# Patient Record
Sex: FEMALE | Race: ASIAN | URBAN - METROPOLITAN AREA
[De-identification: names, ages, dates, MRNs, and addresses within clinical notes are randomized per-mention and may not be internally consistent; named-entity substitution may affect disease eponyms.]

---

## 2018-01-05 ENCOUNTER — HOSPITAL ENCOUNTER (INPATIENT)
Facility: CLINIC | Age: 48
LOS: 4 days | Discharge: HOME | DRG: 885 | End: 2018-01-09
Attending: PSYCHIATRY & NEUROLOGY | Admitting: PSYCHIATRY & NEUROLOGY
Payer: COMMERCIAL

## 2018-01-05 DIAGNOSIS — E55.9 VITAMIN D DEFICIENCY: ICD-10-CM

## 2018-01-05 DIAGNOSIS — F32.2 SEVERE SINGLE CURRENT EPISODE OF MAJOR DEPRESSIVE DISORDER, WITHOUT PSYCHOTIC FEATURES (H): ICD-10-CM

## 2018-01-05 DIAGNOSIS — F32.A DEPRESSIVE DISORDER: Primary | ICD-10-CM

## 2018-01-05 LAB
AMPHETAMINES UR QL SCN: NEGATIVE
BARBITURATES UR QL: NEGATIVE
BENZODIAZ UR QL: NEGATIVE
CANNABINOIDS UR QL SCN: NEGATIVE
COCAINE UR QL: NEGATIVE
ETHANOL UR QL SCN: NEGATIVE
OPIATES UR QL SCN: NEGATIVE

## 2018-01-05 PROCEDURE — 99285 EMERGENCY DEPT VISIT HI MDM: CPT | Mod: 25 | Performed by: PSYCHIATRY & NEUROLOGY

## 2018-01-05 PROCEDURE — 90791 PSYCH DIAGNOSTIC EVALUATION: CPT

## 2018-01-05 PROCEDURE — 80320 DRUG SCREEN QUANTALCOHOLS: CPT | Performed by: PSYCHIATRY & NEUROLOGY

## 2018-01-05 PROCEDURE — 99222 1ST HOSP IP/OBS MODERATE 55: CPT | Mod: AI | Performed by: CLINICAL NURSE SPECIALIST

## 2018-01-05 PROCEDURE — 12400007 ZZH R&B MH INTERMEDIATE UMMC

## 2018-01-05 PROCEDURE — 99285 EMERGENCY DEPT VISIT HI MDM: CPT | Mod: Z6 | Performed by: PSYCHIATRY & NEUROLOGY

## 2018-01-05 PROCEDURE — 80307 DRUG TEST PRSMV CHEM ANLYZR: CPT | Performed by: PSYCHIATRY & NEUROLOGY

## 2018-01-05 RX ORDER — DILTIAZEM HYDROCHLORIDE 120 MG/1
240 CAPSULE, EXTENDED RELEASE ORAL DAILY
Status: DISCONTINUED | OUTPATIENT
Start: 2018-01-06 | End: 2018-01-09 | Stop reason: HOSPADM

## 2018-01-05 RX ORDER — DILTIAZEM HYDROCHLORIDE 240 MG/1
240 CAPSULE, EXTENDED RELEASE ORAL DAILY
COMMUNITY

## 2018-01-05 RX ORDER — BISACODYL 10 MG
10 SUPPOSITORY, RECTAL RECTAL DAILY PRN
Status: DISCONTINUED | OUTPATIENT
Start: 2018-01-05 | End: 2018-01-09 | Stop reason: HOSPADM

## 2018-01-05 RX ORDER — LOSARTAN POTASSIUM 50 MG/1
50 TABLET ORAL DAILY
Status: DISCONTINUED | OUTPATIENT
Start: 2018-01-06 | End: 2018-01-09 | Stop reason: HOSPADM

## 2018-01-05 RX ORDER — LOSARTAN POTASSIUM 50 MG/1
50 TABLET ORAL DAILY
COMMUNITY

## 2018-01-05 RX ORDER — LEVOTHYROXINE SODIUM 100 UG/1
100 TABLET ORAL DAILY
COMMUNITY

## 2018-01-05 RX ORDER — ACETAMINOPHEN 325 MG/1
650 TABLET ORAL EVERY 4 HOURS PRN
Status: DISCONTINUED | OUTPATIENT
Start: 2018-01-05 | End: 2018-01-09 | Stop reason: HOSPADM

## 2018-01-05 RX ORDER — LORATADINE 10 MG/1
10 TABLET ORAL DAILY PRN
COMMUNITY

## 2018-01-05 RX ORDER — HYDROXYZINE HCL 25 MG
12.5-25 TABLET ORAL EVERY 4 HOURS PRN
Status: DISCONTINUED | OUTPATIENT
Start: 2018-01-05 | End: 2018-01-09 | Stop reason: HOSPADM

## 2018-01-05 RX ORDER — BUPROPION HCL 100 MG
25 TABLET ORAL 2 TIMES DAILY
Status: ON HOLD | COMMUNITY
End: 2018-01-09

## 2018-01-05 RX ORDER — ALUMINA, MAGNESIA, AND SIMETHICONE 2400; 2400; 240 MG/30ML; MG/30ML; MG/30ML
30 SUSPENSION ORAL EVERY 4 HOURS PRN
Status: DISCONTINUED | OUTPATIENT
Start: 2018-01-05 | End: 2018-01-09 | Stop reason: HOSPADM

## 2018-01-05 RX ORDER — LEVOTHYROXINE SODIUM 50 UG/1
100 TABLET ORAL DAILY
Status: DISCONTINUED | OUTPATIENT
Start: 2018-01-06 | End: 2018-01-09 | Stop reason: HOSPADM

## 2018-01-05 ASSESSMENT — ENCOUNTER SYMPTOMS
CHEST TIGHTNESS: 0
SHORTNESS OF BREATH: 0
HALLUCINATIONS: 0
DYSPHORIC MOOD: 1
ABDOMINAL PAIN: 0
DIZZINESS: 0
FEVER: 0
BACK PAIN: 0
NERVOUS/ANXIOUS: 1

## 2018-01-05 ASSESSMENT — ACTIVITIES OF DAILY LIVING (ADL)
ORAL_HYGIENE: INDEPENDENT
DRESS: INDEPENDENT
LAUNDRY: WITH SUPERVISION
GROOMING: INDEPENDENT

## 2018-01-05 NOTE — PHARMACY-ADMISSION MEDICATION HISTORY
Admission medication history interview status for the 1/5/2018 admission is complete. See Epic admission navigator for allergy information, pharmacy, prior to admission medications and immunization status.     Medication history interview sources:  Patient, patient's family, [facility] ([place]; [phone])    Changes made to PTA medication list (reason)  Added: loratadine, Wellbutrin  Deleted: none  Changed: Diltiazem (clarified with outpatient pharmacy)    Additional medication history information (including reliability of information, actions taken by pharmacist): The patient and her family were reliable historians and able to discuss medications without difficulty. This writer spoke with the outpatient [facility] pharmacist regarding her recent prescription filling history. The patient has a prescription for Wellbutrin that she has not filled yet (so unable to verify with a pharmacy). She thinks it was 25 mg but was not confident about the dose or the formulation. She was confident it was prescribed for twice daily dosing. If needed, her family offered to have the prescription brought into the hospital.      Prior to Admission medications    Medication Sig Last Dose Taking? Auth Provider   loratadine (CLARITIN) 10 MG tablet Take 10 mg by mouth daily as needed for allergies  Yes Unknown, Entered By History   diltiazem 240 MG 24 hr capsule Take 240 mg by mouth daily 1/5/2018 at AM Yes Unknown, Entered By History   levothyroxine (SYNTHROID/LEVOTHROID) 100 MCG tablet Take 100 mcg by mouth daily 1/5/2018 at AM Yes Unknown, Entered By History   losartan (COZAAR) 50 MG tablet Take 50 mg by mouth daily 1/5/2018 at AM Yes Unknown, Entered By History   buPROPion (WELLBUTRIN) 25 mg quarter-tab Take 25 mg by mouth 2 times daily Not started yet Yes Unknown, Entered By History         Medication history completed by: Celeste Gao, PharmD, BCPP

## 2018-01-05 NOTE — ED PROVIDER NOTES
"  History     Chief Complaint   Patient presents with     Depression     Brought here by our ER nurse Jaquelin. Here with . 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 Diagnosed 2 years ago, \"September 2016 with Stage 3 kidney disease and that is what started the depression\".  Family stressors. Two weeks ago mother had fallen and thought she had a stroke. \"I wish I would not wake up \", but denies SI. Not sleeping for 2 nights.     The history is provided by the patient, medical records, the spouse and a relative.     Pratima uCllen is a 47 year old female who comes in due to her family being very concerned about her worsening depression.  She started getting depressed a few months ago, but in the last 2 weeks she has had a drastic turn for the worse.  She has not been able to go to work (she is a part time RN) and is struggling to get out of bed or do other ADL's.  She has three daughters ages 17, 14, 11 and she is obsessing over them not being emotionally hurt.  She is tracking where they are via their phones and constantly checking on them.  She has some passive suicidal thoughts but no plan.  She has never attempted before.  This is her first episode of depression. She is stressed about being diagnosed with stage 3 kidney disease with subsequent HTN in the summer of 2016.  She was supposed to start wellbutrin for her depression a few weeks ago after seeing a psychiatrist for the first time.  She never started it due to being fearful it would be bad with her kidney issues.  She was supposed to go to her renal MD but missed the appointment due to not being able to get out of bed. Her family is very worried about her and how much her functioning has changed.      Please see the 's assessment in [place] from today for further details.    I have reviewed the Medications, Allergies, Past Medical and Surgical History, and Social History in the Epic system.    Review of Systems "   Constitutional: Negative for fever.   Eyes: Negative for visual disturbance.   Respiratory: Negative for chest tightness and shortness of breath.    Cardiovascular: Negative for chest pain.   Gastrointestinal: Negative for abdominal pain.   Musculoskeletal: Negative for back pain.   Neurological: Negative for dizziness.   Psychiatric/Behavioral: Positive for dysphoric mood and suicidal ideas. Negative for hallucinations and self-injury. The patient is nervous/anxious.    All other systems reviewed and are negative.      Physical Exam   BP: (!) 160/112  Heart Rate: 100  Temp: 97.6  F (36.4  C)  Resp: 16  Weight: 60.4 kg (133 lb 2 oz)  SpO2: 100 %      Physical Exam   Constitutional: She is oriented to person, place, and time. She appears well-developed and well-nourished.   HENT:   Head: Normocephalic and atraumatic.   Mouth/Throat: Oropharynx is clear and moist.   Eyes: Pupils are equal, round, and reactive to light.   Neck: Normal range of motion. Neck supple.   Cardiovascular: Normal rate, regular rhythm and normal heart sounds.    Pulmonary/Chest: Effort normal and breath sounds normal.   Abdominal: Soft. Bowel sounds are normal.   Musculoskeletal: Normal range of motion.   Neurological: She is alert and oriented to person, place, and time.   Skin: Skin is warm and dry.   Psychiatric: Her speech is normal and behavior is normal. Judgment normal. Her mood appears anxious. She is not actively hallucinating. Thought content is not paranoid and not delusional. Cognition and memory are normal. She exhibits a depressed mood. She expresses suicidal ideation. She expresses no homicidal ideation. She expresses no suicidal plans and no homicidal plans.   Pratima is a 46 y/o female who looks her age.  She is well groomed with good eye contact.  She is tearful.   Nursing note and vitals reviewed.      ED Course     ED Course     Procedures               Labs Ordered and Resulted from Time of ED Arrival Up to the Time of  Departure from the ED - No data to display         Assessments & Plan (with Medical Decision Making)   Pratima will be admitted to the hospital due to her drastic decline in functioning not allowing her to do a less intensive program along with having passive suicidal thoughts.  She will go to station 10 under Dr. Ledezma.    I have reviewed the nursing notes.    I have reviewed the findings, diagnosis, plan and need for follow up with the patient.    New Prescriptions    No medications on file       Final diagnoses:   None       1/5/2018   [facility], [place], EMERGENCY DEPARTMENT     Silvio Metcalf MD  01/05/18 1370

## 2018-01-05 NOTE — IP AVS SNAPSHOT
MRN:[id number]                      After Visit Summary   1/5/2018    Pratima Cullen    MRN: [id number]           Thank you!     Thank you for choosing [facility] for your care. Our goal is always to provide you with excellent care.        Patient Information     Date Of Birth          1970        Designated Caregiver       Most Recent Value    Caregiver    Will someone help with your care after discharge? yes    Name of designated caregiver -Perez Cullen    Phone number of caregiver [phone]    Caregiver address [address] [place] [address]      About your hospital stay     You were admitted on:  January 5, 2018 You last received care in the:   [facility]    You were discharged on:  January 9, 2018        Reason for your hospital stay       Depression                  Who to Call     For medical emergencies, please call 911.  For non-urgent questions about your medical care, please call your primary care provider or clinic, None          Attending Provider     Provider Specialty    Silvio Metcalf MD Emergency Medicine    [facility] Pasquale Scott MD Psychiatry       Primary Care Provider Fax #    [facility] [phone]      After Care Instructions     Activity       Your activity upon discharge: activity as tolerated            Diet       Follow this diet upon discharge: Orders Placed This Encounter      Regular Diet Adult            Discharge Instructions       1. Please do not harm yourself or others.  2. Please continue to take your medications.  3. Please follow up with your outpatient care team.  4. Please do not take drugs or alcohol as this will worsen your condition.  5. Please do not take more than the prescribed doses of medications as this may make them dangerous.   6. Please follow your safety plan of action.  7. Please call crisis if having trouble.  8. If having thoughts of harming self or others please come in to the emergency department as soon as  possible.                  Further instructions from your care team        Behavioral Discharge Planning and Instructions      Summary:  You were admitted on 1/5/2018  due to Depression and Suicidal Ideations.  You were treated by Dr. Pasquale Ledezma MD and discharged on 1/9/18 from Station 10 to Home    Principal Diagnosis: Major Depressive Disorder, moderate     Health Care Follow-up Appointments:     Psychiatrist is Dr. Funes @ [facility] in [place]   Phone: [phone]  Fax: [phone] HUC TO FAX DISCHARGE INSTRUCTIONS   [address], [address]  [place] [address]  *You have an intake for psychotherapy with Juan Castro Ph.D [facility] scheduled for Wednesday, January 17th at 9:30 am.   *You have a psychiatric medication management appointment with Dr. Funes scheduled for Wednesday, January 17th at 11:00 am.     Attend all scheduled appointments with your outpatient providers. Call at least 24 hours in advance if you need to reschedule an appointment to ensure continued access to your outpatient providers.   Major Treatments, Procedures and Findings:  You were provided with: a psychiatric assessment, assessed for medical stability, medication evaluation and/or management, group therapy and milieu management    Symptoms to Report: feeling more aggressive, increased confusion, losing more sleep, mood getting worse or thoughts of suicide    Early warning signs can include: increased depression or anxiety sleep disturbances increased thoughts or behaviors of suicide or self-harm  increased unusual thinking, such as paranoia or hearing voices    Safety and Wellness:  Take all medicines as directed.  Make no changes unless your doctor suggests them.      Follow treatment recommendations.  Refrain from alcohol and non-prescribed drugs.  Ask your support system to help you reduce your access to items that could harm yourself or others. If there is a  "concern for safety, call 911.    Resources:   Crisis Intervention: 978.618.3007 or 801-035-2423 (TTY: 367.321.5995).  Call anytime for help.  National [place] on Mental Illness (www.[place].charlene.org): 689.953.9602 or 493-144-2442.  Alcoholics Anonymous (www.alcoholics-anonymous.org): Check your phone book for your local chapter.  Suicide Awareness Voices of Education (SAVE) (www.save.org): 299-257-YENO (7126)  National Suicide Prevention Line (www.mentalhealthmn.org): 639-427-GCFL (4050)  Mental Health Consumer/Survivor Network of [place] (www.mhcsn.net): 172.208.5714 or 200-390-1754  Mental Health Association of [place] (www.mentalhealth.org): 749.425.2099 or 239-826-4528  Self- Management and Recovery Training., [employer]ee-- Toll free: 977.281.5195  [place].YesPlz!  Text 4 Life: txt \"LIFE\" to 39807 for immediate support and crisis intervention  Crisis text line: Text \"START\" to 065-697. Free, confidential, 24/7.  Crisis Intervention: 188.166.9189 or 535-098-7945. Call anytime for help.     The treatment team has appreciated the opportunity to work with you.     Please take care and make your recovery a daily recovery.   If you have any questions or concerns our unit number is [phone].  Thank you.         Pending Results     No orders found from 1/3/2018 to 1/6/2018.            Statement of Approval     Ordered          01/09/18 0951  I have reviewed and agree with all the recommendations and orders detailed in this document.  EFFECTIVE NOW     Approved and electronically signed by:  Pasquale Ledezma MD             Admission Information     Date & Time Provider Department Dept. Phone    1/5/2018 Pasquale Ledezma MD [facility] [phone]      Your Vitals Were     Blood Pressure Pulse Temperature Respirations Height Weight    181/94 100 98.6  F (37  C) 16 1.524 m (5') 58.3 kg (128 lb 8 oz)    Last Period Pulse Oximetry BMI (Body Mass Index)             01/05/2018 100% 25.1 kg/m2         MyChart " "Information     [employer] lets you send messages to your doctor, view your test results, renew your prescriptions, schedule appointments and more. To sign up, go to [email or web address] . Click on \"Log in\" on the left side of the screen, which will take you to the Welcome page. Then click on \"Sign up Now\" on the right side of the page.     You will be asked to enter the access code listed below, as well as some personal information. Please follow the directions to create your username and password.     Your access code is: [email or web address]  Expires: 2018 10:05 AM     Your access code will  in 90 days. If you need help or a new code, please call your [place] clinic or [phone].        Care EveryWhere ID     This is your Care EveryWhere ID. This could be used by other organizations to access your [place] medical records  [id number]        Equal Access to Services     SUELLEN JARRELL : Janet montenegro Solucrecia, waaxda lususanne, qaybta kaalmada adelacy, silverio noriega . So [facility] [phone].    ATENCIÓN: Si prudencio ferrer, tiene a skinner disposición servicios gratuitos de asistencia lingüística. Llame al [phone].    We comply with applicable federal civil rights laws and [place] laws. We do not discriminate on the basis of race, color, national origin, age, disability, sex, sexual orientation, or gender identity.               Review of your medicines      START taking        Dose / Directions    cholecalciferol 2000 UNITS tablet   Used for:  Vitamin D deficiency        Dose:  2000 Units   Start taking on:  1/10/2018   Take 2,000 Units by mouth daily   Quantity:  30 tablet   Refills:  0       escitalopram 5 MG tablet   Commonly known as:  LEXAPRO        Dose:  5 mg   Start taking on:  1/10/2018   Take 1 tablet (5 mg) by mouth daily   Quantity:  30 tablet   Refills:  0         CONTINUE these medicines which have NOT CHANGED        Dose / Directions    diltiazem 240 MG 24 hr " capsule        Dose:  240 mg   Take 240 mg by mouth daily   Refills:  0       levothyroxine 100 MCG tablet   Commonly known as:  SYNTHROID/LEVOTHROID        Dose:  100 mcg   Take 100 mcg by mouth daily   Refills:  0       loratadine 10 MG tablet   Commonly known as:  CLARITIN        Dose:  10 mg   Take 10 mg by mouth daily as needed for allergies   Refills:  0       losartan 50 MG tablet   Commonly known as:  COZAAR        Dose:  50 mg   Take 50 mg by mouth daily   Refills:  0         STOP taking     buPROPion 25 mg quarter-tab   Commonly known as:  WELLBUTRIN                Where to get your medicines      These medications were sent to [facility] [place], [place] - [address]  [address] [place] [address], [facility] [address]     Phone:  [phone]     escitalopram 5 MG tablet         Some of these will need a paper prescription and others can be bought over the counter. Ask your nurse if you have questions.     You don't need a prescription for these medications     cholecalciferol 2000 UNITS tablet                Protect others around you: Learn how to safely use, store and throw away your medicines at www.disposemymeds.org.             Medication List: This is a list of all your medications and when to take them. Check marks below indicate your daily home schedule. Keep this list as a reference.      Medications           Morning Afternoon Evening Bedtime As Needed    cholecalciferol 2000 UNITS tablet   Take 2,000 Units by mouth daily   Start taking on:  1/10/2018   Last time this was given:  2,000 Units on 1/9/2018  9:18 AM                                   diltiazem 240 MG 24 hr capsule   Take 240 mg by mouth daily   Last time this was given:  240 mg on 1/9/2018 10:54 AM                                   escitalopram 5 MG tablet   Commonly known as:  LEXAPRO   Take 1 tablet (5 mg) by mouth daily   Start taking on:  1/10/2018   Last time this was given:  5 mg on 1/9/2018  9:18 AM                                    levothyroxine 100 MCG tablet   Commonly known as:  SYNTHROID/LEVOTHROID   Take 100 mcg by mouth daily   Last time this was given:  100 mcg on 1/9/2018  9:18 AM                                   loratadine 10 MG tablet   Commonly known as:  CLARITIN   Take 10 mg by mouth daily as needed for allergies                                   losartan 50 MG tablet   Commonly known as:  COZAAR   Take 50 mg by mouth daily   Last time this was given:  50 mg on 1/9/2018  9:18 AM

## 2018-01-05 NOTE — ED NOTES
"ED to Behavioral Floor Handoff    SITUATION  Praitma Cullen is a 47 year old female who speaks English and lives in a home with family members The patient arrived in the ED by private car from home with a complaint of Depression (Brought here by our ER nurse Jaquelin. Here with . 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 Diagnosed 2 years ago, \"September 2016 with Stage 3 kidney disease and that is what started the depression\".  Family stressors. Two weeks ago mother had fallen and thought she had a stroke. \"I wish I would not wake up \", but denies SI. Not sleeping for 2 nights.)  .The patient's current symptoms started/worsened 2 week(s) ago and during this time the symptoms have increased.   In the ED, pt was diagnosed with   Final diagnoses:   Severe single current episode of major depressive disorder, without psychotic features (H)        Initial vitals were: BP: (!) 160/112  Heart Rate: 100  Temp: 97.6  F (36.4  C)  Resp: 16  Weight: 60.4 kg (133 lb 2 oz)  SpO2: 100 %   --------  Is the patient diabetic? No   If yes, last blood glucose? --     If yes, was this treated in the ED? --  --------  Is the patient inebriated (ETOH) No or Impaired on other substances? No  MSSA done? N/A  Last MSSA score: --    Were withdrawal symptoms treated? N/A  Does the patient have a seizure history? No. If yes, date of most recent seizure--  --------  Is the patient patient experiencing suicidal ideation? reports occasional suicidal thoughts representing feeling that life is not worth feeling    Homicidal ideation? denies current or recent homicidal ideation or behaviors.    Self-injurious behavior/urges? denies current or recent self injurious behavior or ideation.  ------  Was pt aggressive in the ED No  Was a code called No  Is the pt now cooperative? Yes  -------  Meds given in ED: Medications - No data to display   Family present during ED course? Yes  Family currently present? " Yes    BACKGROUND  Does the patient have a cognitive impairment or developmental disability? No  Allergies:   Allergies   Allergen Reactions     Cats      Ibuprofen Hives     Metamucil [Psyllium] Itching and Cough     Itching nose     Mold      Ragweeds    .   Social demographics are   Social History     Social History     Marital status:      Spouse name: N/A     Number of children: N/A     Years of education: N/A     Social History Main Topics     Smoking status: Never Smoker     Smokeless tobacco: None     Alcohol use No     Drug use: None     Sexual activity: Not Asked     Other Topics Concern     Parent/Sibling W/ Cabg, Mi Or Angioplasty Before 65f 55m? No     Social History Narrative        ASSESSMENT  Labs results   Labs Ordered and Resulted from Time of ED Arrival Up to the Time of Departure from the ED   DRUG ABUSE SCREEN 6 CHEM DEP URINE ([facility])      Imaging Studies: No results found for this or any previous visit (from the past 24 hour(s)).   Most recent vital signs BP (!) 155/97  Temp 97.6  F (36.4  C) (Oral)  Resp 16  Wt 60.4 kg (133 lb 2 oz)  LMP 01/05/2018  SpO2 100%   Abnormal labs/tests/findings requiring intervention:---   Pain control: pt had none  Nausea control: pt had none    RECOMMENDATION  Are any infection precautions needed (MRSA, VRE, etc.)? No If yes, what infection? --  ---  Does the patient have mobility issues? independently. If yes, what device does the pt use? ---  ---  Is patient on 72 hour hold or commitment? No If on 72 hour hold, have hold and rights been given to patient? N/A patient is voluntary   Are admitting orders written if after 10 p.m. ?N/A  Tasks needing to be completed:---     Celeste Cuevas   [facility]-- [phone]   [phone] [facility] ED

## 2018-01-05 NOTE — IP AVS SNAPSHOT
[address]    [address] [place], [place] [address]    [facility] [address]    Phone:  [phone]                                       After Visit Summary   1/5/2018    Pratima Cullen    MRN: [id number]           After Visit Summary Signature Page     I have received my discharge instructions, and my questions have been answered. I have discussed any challenges I see with this plan with the nurse or doctor.    ..........................................................................................................................................  Patient/Patient Representative Signature      ..........................................................................................................................................  Patient Representative Print Name and Relationship to Patient    ..................................................               ................................................  Date                                            Time    ..........................................................................................................................................  Reviewed by Signature/Title    ...................................................              ..............................................  Date                                                            Time

## 2018-01-05 NOTE — PROGRESS NOTES
"   01/05/18 1742   Patient Belongings   Did you bring any home meds/supplements to the hospital?  No   Patient Belongings shoes;watch;cell phone/electronics;clothing;other (see comments)   Disposition of Belongings Locker;Kept with patient   Belongings Search Yes   Clothing Search Yes   Second Staff Aida SANCHEZ       ITEMS IN PT LOCKER    Boots  Knit hat  \"[employer]\" bag w/handles  Small zipper bag w/assorted cosmetics and toiletries  \"iPhone\" in black case  USB cord with wall plug-in    ITEMS WITH PT  Jeans x2  Zip-up x2  T-shrit x3  Long-sleeved shirt x3  Socks  Gray pants x2  Underwear    A               Admission:  I am responsible for any personal items that are not sent to the safe or pharmacy.  [facility] is not responsible for loss, theft or damage of any property in my possession.    Signature:  _________________________________ Date: _______  Time: _____                                              Staff Signature:  ____________________________ Date: ________  Time: _____      2nd Staff person, if patient is unable/unwilling to sign:    Signature: ________________________________ Date: ________  Time: _____     Discharge:  [facility] has returned all of my personal belongings:    Signature: _________________________________ Date: ________  Time: _____                                          Staff Signature:  ____________________________ Date: ________  Time: _____           "

## 2018-01-06 LAB
ALBUMIN SERPL-MCNC: 3.4 G/DL (ref 3.4–5)
ALP SERPL-CCNC: 65 U/L (ref 40–150)
ALT SERPL W P-5'-P-CCNC: 16 U/L (ref 0–50)
ANION GAP SERPL CALCULATED.3IONS-SCNC: 8 MMOL/L (ref 3–14)
AST SERPL W P-5'-P-CCNC: 13 U/L (ref 0–45)
BASOPHILS # BLD AUTO: 0 10E9/L (ref 0–0.2)
BASOPHILS NFR BLD AUTO: 0.3 %
BILIRUB SERPL-MCNC: 0.4 MG/DL (ref 0.2–1.3)
BUN SERPL-MCNC: 41 MG/DL (ref 7–30)
CALCIUM SERPL-MCNC: 8.5 MG/DL (ref 8.5–10.1)
CHLORIDE SERPL-SCNC: 113 MMOL/L (ref 94–109)
CHOLEST SERPL-MCNC: 212 MG/DL
CO2 SERPL-SCNC: 22 MMOL/L (ref 20–32)
CREAT SERPL-MCNC: 2.35 MG/DL (ref 0.52–1.04)
DIFFERENTIAL METHOD BLD: NORMAL
EOSINOPHIL # BLD AUTO: 0.2 10E9/L (ref 0–0.7)
EOSINOPHIL NFR BLD AUTO: 2.8 %
ERYTHROCYTE [DISTWIDTH] IN BLOOD BY AUTOMATED COUNT: 12.7 % (ref 10–15)
GFR SERPL CREATININE-BSD FRML MDRD: 22 ML/MIN/1.7M2
GLUCOSE SERPL-MCNC: 91 MG/DL (ref 70–99)
HCT VFR BLD AUTO: 39.7 % (ref 35–47)
HDLC SERPL-MCNC: 45 MG/DL
HGB BLD-MCNC: 12.9 G/DL (ref 11.7–15.7)
IMM GRANULOCYTES # BLD: 0 10E9/L (ref 0–0.4)
IMM GRANULOCYTES NFR BLD: 0.3 %
LDLC SERPL CALC-MCNC: 118 MG/DL
LYMPHOCYTES # BLD AUTO: 1.6 10E9/L (ref 0.8–5.3)
LYMPHOCYTES NFR BLD AUTO: 20.4 %
MCH RBC QN AUTO: 29.4 PG (ref 26.5–33)
MCHC RBC AUTO-ENTMCNC: 32.5 G/DL (ref 31.5–36.5)
MCV RBC AUTO: 90 FL (ref 78–100)
MONOCYTES # BLD AUTO: 0.4 10E9/L (ref 0–1.3)
MONOCYTES NFR BLD AUTO: 5.4 %
NEUTROPHILS # BLD AUTO: 5.5 10E9/L (ref 1.6–8.3)
NEUTROPHILS NFR BLD AUTO: 70.8 %
NONHDLC SERPL-MCNC: 167 MG/DL
NRBC # BLD AUTO: 0 10*3/UL
NRBC BLD AUTO-RTO: 0 /100
PLATELET # BLD AUTO: 248 10E9/L (ref 150–450)
POTASSIUM SERPL-SCNC: 5.4 MMOL/L (ref 3.4–5.3)
PROT SERPL-MCNC: 7.1 G/DL (ref 6.8–8.8)
RBC # BLD AUTO: 4.39 10E12/L (ref 3.8–5.2)
SODIUM SERPL-SCNC: 143 MMOL/L (ref 133–144)
T4 FREE SERPL-MCNC: 1.23 NG/DL (ref 0.76–1.46)
TRIGL SERPL-MCNC: 245 MG/DL
TSH SERPL DL<=0.005 MIU/L-ACNC: 0.35 MU/L (ref 0.4–4)
WBC # BLD AUTO: 7.8 10E9/L (ref 4–11)

## 2018-01-06 PROCEDURE — 84439 ASSAY OF FREE THYROXINE: CPT | Performed by: PSYCHIATRY & NEUROLOGY

## 2018-01-06 PROCEDURE — 36415 COLL VENOUS BLD VENIPUNCTURE: CPT | Performed by: PSYCHIATRY & NEUROLOGY

## 2018-01-06 PROCEDURE — 80061 LIPID PANEL: CPT | Performed by: PSYCHIATRY & NEUROLOGY

## 2018-01-06 PROCEDURE — 80053 COMPREHEN METABOLIC PANEL: CPT | Performed by: PSYCHIATRY & NEUROLOGY

## 2018-01-06 PROCEDURE — 84443 ASSAY THYROID STIM HORMONE: CPT | Performed by: PSYCHIATRY & NEUROLOGY

## 2018-01-06 PROCEDURE — 25000132 ZZH RX MED GY IP 250 OP 250 PS 637: Performed by: PSYCHIATRY & NEUROLOGY

## 2018-01-06 PROCEDURE — 25000132 ZZH RX MED GY IP 250 OP 250 PS 637: Performed by: CLINICAL NURSE SPECIALIST

## 2018-01-06 PROCEDURE — 82306 VITAMIN D 25 HYDROXY: CPT | Performed by: PSYCHIATRY & NEUROLOGY

## 2018-01-06 PROCEDURE — 85025 COMPLETE CBC W/AUTO DIFF WBC: CPT | Performed by: PSYCHIATRY & NEUROLOGY

## 2018-01-06 PROCEDURE — 12400007 ZZH R&B MH INTERMEDIATE UMMC

## 2018-01-06 RX ORDER — ESCITALOPRAM OXALATE 5 MG/1
5 TABLET ORAL DAILY
Status: DISCONTINUED | OUTPATIENT
Start: 2018-01-06 | End: 2018-01-08

## 2018-01-06 RX ADMIN — ESCITALOPRAM OXALATE 5 MG: 5 TABLET, FILM COATED ORAL at 11:06

## 2018-01-06 RX ADMIN — LOSARTAN POTASSIUM 50 MG: 50 TABLET, FILM COATED ORAL at 09:24

## 2018-01-06 RX ADMIN — VITAMIN D, TAB 1000IU (100/BT) 2000 UNITS: 25 TAB at 11:05

## 2018-01-06 RX ADMIN — LEVOTHYROXINE SODIUM 100 MCG: 50 TABLET ORAL at 09:25

## 2018-01-06 RX ADMIN — DILTIAZEM HYDROCHLORIDE 240 MG: 120 CAPSULE, COATED, EXTENDED RELEASE ORAL at 11:06

## 2018-01-06 ASSESSMENT — ACTIVITIES OF DAILY LIVING (ADL)
DRESS: STREET CLOTHES
ORAL_HYGIENE: INDEPENDENT
HYGIENE/GROOMING: INDEPENDENT
DRESS: STREET CLOTHES
GROOMING: INDEPENDENT
ORAL_HYGIENE: INDEPENDENT

## 2018-01-06 NOTE — PROGRESS NOTES
"Admission  Patient came to [facility] from ED/[facility] as a voluntary admission. Reason for admission: severe depression, unable to sleep, eat, or work. Patient reported being diagnosed with depression 1 week ago. She said that she has been feeling sad and depressed in the last 3 months. In September 2016, she was diagnosed with autoimmune disease and stage 3 kidney failure. She said that she has been seeing a nephrologist regularly and is not following a special diet at this time. She also said that she has not been able to taste her food in the last few weeks and it is bothersome for her. Reported loosing more than 5 lb in the last month. Patient would like to see a dietitian if possible.  Patient has diagnosis of HTN and said that it has not been well controlled, that her diastolic numbers were mostly high. Patient was prescribed Wellbutrin, but she has not started it as she was afraid about its effect on her kidneys. She was also worried about her vitamin D level.     Pt sated: \"I feel like it's OK if I don't wake up in the morning\", \"But I would never do anything to hurt myself\", \"I believe in God, I love my daughters and felicia ,I would never do anything to hurt them\". \"I feel my judgement is not there anymore\", \"I feel hopeless\", \"I feel up and down\", \"I don't know what is the proper thing to say anymore\",, \"My middle daughter broke up with her 1st BF and can't watch her suffer\" (patient started crying when she said it), \"I want to protect my children from being hurt, but I end up hurting them\", \"My oldest daughter did not tell me that she likes this boy, I feel she is lying to me\",  \"My mother-in-law had a heart attach before V[facility]\", \"I should not be complaining, I have very good kids, excellent in school and sports\", \"My  and my sister are very supportive\".    Patient denied having suicidal ideations (now and lifetime). However, she said that she has passive wish to be dead, to not wake up in the " morning. During admission interview, patient rated her depression at 8/10 and anxiety 5/10, denied hallucinations (now and lifetime), paranoia or delusions. She denied pain at this time. No behavioral issues anticipated.     Patient is restarting her home medications, Wellbutrin will be addressed by Dr Dominguez tomorrow. She will have routine labs and the requested vitamin D level in the morning. Patient is working on getting the name and phone number of her nephrologist.

## 2018-01-06 NOTE — H&P
"DATE OF ASSESSMENT:  01/06/2018       IDENTIFYING INFORMATION:  Pratima Cullen is a 47-year-old  mother of 3, presenting with increased depression and suicidal ideation.        CHIEF COMPLAINT:  \"Everything is catching up to me.\"      HISTORY OF PRESENT ILLNESS:  Pratima Cullen is a 47-year-old  female with 3 children, presenting with increased depression and suicidal ideation.  The patient reports that her depression has dramatically increased in the last 2 weeks.  She states she started to have depressed feelings in 02/2017.  In February she felt sad but in the summer months her mood improved.  She reports her mood has slowly declined the last 2-3 months.  She reports her depression has increased.  In the last 2 weeks she has been unable to get to work.  She is struggling to get out of bed.  She does not pay attention to her ADLs.  Patient reports this is her first episode of depression.  The patient reports stressors of being worried about her 3 daughters who are age 18, 14 and 11.  The patient states she does not want them to get hurt, but knows that they should be able to make mistakes.  That is part of life.  The patient is stressed out because she reports that she becomes very controlling over her children's lives and then they start to lie to her, which makes her very angry. She denies tracking her children with their cell phones. She states that she makes her kids put their phones in her bedroom at night so they can't be on their phones late at night.The patient reports she is also going through menopause and is feeling the effects of \"empty nest syndrome.\"  The patient reports medical stress of being diagnosed with stage III kidney disease in 09/2016.      PSYCHIATRIC REVIEW OF SYSTEMS:  The patient reports she is depressed, \"I don't have a purpose.\"  She is unable to get out of bed.  She has poor ADLs.  Patient states she is isolating.  She is apathetic.  She sleeps a lot.  She reports she " sleeps 10-12 hours per day.  The patient is very worried about not cleaning her house, asking her family to do that for her.  The patient has poor appetite and reports that she has lost 5 pounds.  The patient reports that she is having passive suicidal thoughts.  She does not have an active plan.  She denies homicidal thoughts.  The patient reports her anxiety has increased.  The patient reports that she is anxious about over thinking everything.  She is also very anxious about her 3 daughters.  Patient became very tearful when she reported that she felt she was causing her children pain because she was being so controlling.  The patient reports that she has obsessive thoughts about her daughters getting hurt and her wanting to protect them.  Patient does not endorse any symptoms of esme.  She does not endorse psychosis.  She denies symptoms of PTSD, eating disorder or OCD.      PAST PSYCHIATRIC HISTORY:  No prior inpatient hospitalizations, no prior mental health treatments.  The patient reports that she did go to her primary care physician to obtain a prescription for depression, which was Wellbutrin.  The patient states she did not want to take medication because she was worried it would increase her blood pressure.  The patient is currently being treated for high blood pressure.  The patient states she was apathetic about calling her nephrologist, Dr. Stephen Diaz in the [facility] for approval to start Wellbutrin.      PAST MEDICAL HISTORY:  Stage III kidney disease diagnosed 2016, hypothyroidism, hypertension.      SUBSTANCE ABUSE HISTORY:  The patient denies any substance abuse.  U-tox was negative.      FAMILY HISTORY:  The patient reports that she grew up in the [facility].  Her father  when she was 6 years old.  She reports her mother was very young when her father  and so the patient was cared for by her grandmother.  The patient reports her grandmother was very strict and she was not  allowed to hold hands with boys or go out on dates.  Patient feels that her background of a strict upbringing is influencing her decisions that she is making today with her 3 daughters.  The patient reports her older sister endorses depression and has been in therapy.  Her younger sister has been hospitalized at [facility] for depression.      SOCIAL HISTORY:  The patient is  with 3 daughters, ages 18, 14 and 11.  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      MEDICAL REVIEW OF SYSTEMS:  Reviewed documentation for a 10-point systems review completed by Dr. Silvio Metcalf dated 01/05/2018.  No changes noted.      PHYSICAL EXAMINATION:     VITAL SIGNS:  Blood pressure 160/112, heart rate is 100, temperature is 97.6 Fahrenheit, respirations 16.  Weight is 133 pounds 2 ounces.  SpO2 is at 100%.  Reviewed documentation for physical examination completed by Dr. Silvio Metcalf dated 01/05/2018.  No changes are noted.      MENTAL STATUS EXAMINATION:  General appearance:  This appears her stated age.  She is appropriately dressed.  She has good hygiene.  The patient stated that she got up today and got dressed and put on makeup.  The patient stated she typically had not been doing that.  Patient was cooperative in accompanying me to the interview room.  She maintained adequate eye contact.  She did not display any psychomotor abnormalities.  Speech was spontaneous.  She used conversational rate, rhythm and tone.  She elaborated appropriately.  She describes her mood today as depressed.  Affect was blunted and congruent.  The patient cried throughout the interview.  Thought process was linear and logical.  Associations were intact.  Thought content did not contain any evidence of psychosis.  She endorses passive suicidal thoughts, no active plan.  She denies homicidal thoughts.  Insight and judgment appeared to be fair.  Cognition appears intact to interviewing including orientation to person,  place, time and situation, use of language and fund of knowledge.  Recent and remote memory are grossly intact.  Muscle strength, tone and gait appear to be within normal limits upon observation.      ASSESSMENT:   1.  Major depressive disorder, moderate.   2.  Stage III kidney disease.   3.  Hypertension.      PLAN:   1.  The patient has been admitted to behavioral unit 4A on a voluntary basis.   2.  Discussed medications with the patient.  The patient does not want to take Wellbutrin.  She is fearful that it will increase her blood pressure even more.  Discussed using 5 mg of Lexapro to address her depression and anxiety symptoms.  Discussed risks, benefits and side effects of medication with patient.   3.  Psychosocial treatments to be addressed with CTC.    4. Discussed with patient Dr. Bojorquez will assume her care on Monday.     DEBRA A. NAEGELE, APRN, CNS             D: 2018 12:24   T: 2018 13:58   MT: FELIZ      Name:     KHARI ARANA   MRN:      [id number]        Account:      [id number]   :      1970           Admitted:     [id number]      Document: [id number]

## 2018-01-06 NOTE — PROGRESS NOTES
"SPIRITUAL HEALTH SERVICES  SPIRITUAL ASSESSMENT Progress Note  [facility] ([facility] 10N     PRIMARY FOCUS:     Emotional/spiritual/Roman Catholic distress    Support for coping    REFERRAL SOURCE: Epic request for hospital     ILLNESS CIRCUMSTANCES:   Reviewed documentation. Reflective conversation shared with Charlotte which integrated elements of illness and family narratives.     Context of Serious Illness/Symptom(s) - Pt spoke at length about her concern for her daughters, especially her 18-year-old daughter, being sexually active.  Also \"In September 2016 I was diagnosed with kidney disease.\"    Resources for Support - Stated  and sister are supportive    DISTRESS:     Emotional/Spiritual/Existential Distress - Discerned with pt that she is feeling pain around not being able to completely protect her daughters, also stated her mother was not present much when she herself was a child and she was raised by her grandmother.    Adventism Distress - \"I just realized that in the last month I haven't been praying.\"    Social/Cultural/Economic Distress - Not discussed.    SPIRITUAL/Scientologist COPING:     Lutheran/Caty - Stated she attends [employer] \"when we can.\"     Spiritual Practice(s) - Stated prayer was important and she has not been praying as regularly lately.    Emotional/Relational/Existential Connections - Family connections, concerned that at times she hurts her family members by the things she says.  Also stated feeling very hurt that her teenaged daughter occasionally lies.    GOALS OF CARE:    Goals of Care - Encouraged pt to take care of herself.  Encouraged pt to see a counselor.  Prayed for pt, and also introduced pt to mindfulness meditation and deep breathing, encouraged pt to practice.    Meaning/Sense-Making - Pt stated she would like to better take care of herself.    PLAN: Unit  continue to follow.    Kirti Henao  Chaplain Resident  Pager [phone]  "

## 2018-01-06 NOTE — PROGRESS NOTES
CLINICAL NUTRITION SERVICES - ASSESSMENT NOTE     Nutrition Prescription    RECOMMENDATIONS FOR MDs/PROVIDERS TO ORDER:  At discharge, recommend referral to out-patient RD associated with primary nephrologist for further diet education/modifications pending renal function/changes to lab values.     Malnutrition Status:    Patient does not meet two of the above criteria necessary for diagnosing malnutrition    Recommendations already ordered by Registered Dietitian (RD):  None currently     Future/Additional Recommendations:  1. Continue regular diet due to report of variable intakes with recent weight loss.   2. If intakes improve and/or renal labs worsen, consider changing diet to renal diet (non-dialysis) OR potassium restricted diet.  3. If decline in intakes, consider trial of oral nutrition supplements between meals (suggest Suplena).      REASON FOR ASSESSMENT  Pratima Cullen is a/an 47 year old female assessed by the dietitian for Admission Nutrition Risk Screen for reduced oral intake over the last month and RN Consult - Possible implementation of renal diet: stage 3 kidney disease     NUTRITION HISTORY  Pratima reports being followed closely by her primary nephrologist with diagnosis of stage 3 kidney disease September 2016. Reports she has never seen an out-patient RD, and has not been instructed to follow a specific diet. At baseline, tries to limit sodium intakes to prevent edema and limits simple CHO due to report of being pre-diabetic and having GDM during last pregnancy. Indicates she would like to meet with an RD post discharge to learn more about a renal diet and make changes to slow/delay progression of kidney disease.  Over the past 2-3 weeks, reports changes to taste and currently having decreased taste sensation which she attributes to depression and kidney disease. Intakes have been decreased, estimate at ~50% of baseline. Loves to cook and relies on  to taste food items she has prepared  "prior to consuming them herself.   Typical food items consumed include rice, eggs, sausage, chicken, pork, beef, oranges, bananas (seldom), grapefruit, green beans, spinach and avocado.   Information obtained form patient.     CURRENT NUTRITION ORDERS  Diet: Regular  Intake/Tolerance: Has had fair appetite/intakes since admit. Consumed 100% eggs, 1/2 banana bread, 100% orange juice and a few sips of milk at breakfast today. For dinner last evening, consumed 100% cranberry juice, most of a ham sandiwch and 50% of a lettuce salad. Reports she would have eaten more, however received default trays and not all items received were items of preference. Tolerated PO well with no N/V/D.     LABS  Labs reviewed; from 1/6/18 at 0821  Na+ 143 - wnl  K+ 5.4 - elevated  BUN 41 - elevated  Crt 2.35 - elevated  GFR 22 - low     MEDICATIONS  Medications reviewed  Vitamin D    ANTHROPOMETRICS  Height: 152.4 cm (5' 0\")  Most Recent Weight: 58.9 kg (129 lb 12.8 oz)    IBW: 45.5 kg  BMI: Overweight BMI 25-29.9  Weight History: No weight history available per EPIC review to assess trends. Reported UBW of 135 lbs with recent 5-6 lb weight loss (estimated ~4%).     Dosing Weight: 49 kg (adjusted for >120% IBW)    ASSESSED NUTRITION NEEDS  Estimated Energy Needs: 5099-2804 kcals/day (25 - 30 kcals/kg)  Justification: Maintenance  Estimated Protein Needs: 39 grams protein/day (0.8 gm/kg - RDA)  Justification: Maintenance, adjust pending renal function   Estimated Fluid Needs: 1 mL/kcal or per team   Justification: Maintenance    PHYSICAL FINDINGS  See malnutrition section below.  Appears previously well nourished     MALNUTRITION  % Intake: < 75% for > 7 days (non-severe)  % Weight Loss: Weight loss does not meet criteria  Subcutaneous Fat Loss: None observed  Muscle Loss: None observed  Fluid Accumulation/Edema: None noted  Malnutrition Diagnosis: Patient does not meet two of the above criteria necessary for diagnosing " malnutrition    NUTRITION DIAGNOSIS  Predicted inadequate nutrient intake related to mental health, variable apeptite as evidenced by reliance on PO with potential to meet <100% assessed needs.       INTERVENTIONS  Implementation  Discussed nutrition history and PO since admission. Discussed menu ordering and snacks available on the unit. Discussed oral nutrition supplements with plan for trial of Suplena if intakes decline. Reviewed lab values and provided AND handout Potassium Content of Foods due to elevated level. Suggested reviewing list and pairing intakes of high containing items with lower items to help prevent excessive intakes. Cleofe with several questions regarding renal diet and if this is indicated - deferred to outpatient nephrologist at this time. Strongly recommended to request visit with RD following appointment with nephrologist in February to further disucss renal function and recommended diet. Encouraged adequate PO of food and fluids surrounding admission.     Goals  Patient to consume % of nutritionally adequate meal trays TID, or the equivalent with supplements/snacks.     Monitoring/Evaluation  Progress toward goals will be monitored and evaluated per protocol.    Kady Jaimes RD, LD  Unit Pager: [phone]  Weekend/On-call Pager: [phone]

## 2018-01-06 NOTE — PROGRESS NOTES
Initial Psychosocial Assessment    I have reviewed the chart, met with the patient, and developed Care Plan.  Information for assessment was obtained from patient and chart notes.     Presenting Problem:  Patient was admitted on a voluntary basis with worsening depression, inability to care for herself and passive suicidal ideation, wishing to be dead. She has developed excessive worries regarding her daughters. She has stage 3 kidney disease and high blood pressure, also worries for her.  She has been missing work due to depression and family became concerned.     History of Mental Health and Chemical Dependency:  This is patient's first mental health admission. She was diagnosed with depression a couple of months ago. She was diagnosed with stage 3 kidney disease in 2016.    Family Description (Constellation, Family Psychiatric History):  Patient is  with 3 daughters ages 17, 14 and 11.  Paternal uncle with likely mental illness.  Three sisters involved and supportive.  Patient and sisters were raised by grandmother and mother after death of father.     Significant Life Events (Illness, Abuse, Trauma, Death):  Father  at age 6.   Relocated from [facility] to  at age 17.    Living Situation:  Patient lives with her spouse and daughters.    Educational Background:  Bachelor's degree.    Occupational History:  Patient works part-time as an RN at [employer].    Financial Status:  Stable.  Patient endorses some worries regarding how to help her oldest daughter pay for college in the future.     Legal Issues:  None     Ethnic/Cultural Issues:  Patient was born in the [facility] and moved to the  at age 17.    Spiritual Orientation:  Patient endorses a belief in God and goes to [employer].     Service History:  None     Social Functioning (organization, interests):  Patient indicates that she used to enjoy exercising but has not done this for a while.    Family is supportive and patient  finds her work rewarding.    Current Treatment Providers are:  PCP is at [facility] in [place] [phone].  Psychiatrist is Dr. Funes, [facility] [phone].  Nephrologist is Dr. Stephen Diaz in [place] .    Social Service Assessment/Plan:  Patient has been seen by the on-call psychiatric provider.  Lexapro has been initiated. Nutrition consult in process. Patient indicates that she plans to continue to see Dr. Funes for medication management. She is interested in finding a female therapist near her home ([facility] or [place]).  Patient will meet with the treatment team on Monday to further coordinate plan of care.

## 2018-01-06 NOTE — PROGRESS NOTES
"Pt reported her goal for the evening for to finish the book that she was reading. Pt was calm and pleasant upon check in. Pt rated depression as 8/10 and anxiety as 5/10. Pt's family visited her earlier in the day which seemed to put her in good spirits. Pt was upset that her 12 year old daughter had to sit outside the unit while the rest of her family saw her. Pt denies SI or SIB. Pt reports her concentration is much better today, whereas yesterday she stated it was \"foggy.\" Pt's appetite is still low, the same as it was upon arrival to the facility. Pt reported not sleeping well due to the noise of the unit (checks, people talking in halls, roommate, etc.) Pt stated she was going to wear ear plugs tonight to help.      01/06/18 1700   Behavioral Health   Hallucinations denies / not responding to hallucinations   Thinking intact   Orientation person: oriented;place: oriented;time: oriented;date: oriented   Memory baseline memory   Insight insight appropriate to situation;insight appropriate to events   Judgement (adequate)   Eye Contact at examiner   Affect blunted, flat   Mood mood is calm   Physical Appearance/Attire attire appropriate to age and situation   Hygiene well groomed   Suicidality (Denies SI)   1. Wish to be Dead No   2. Non-Specific Active Suicidal Thoughts  No   Self Injury (Denies SIB)   Elopement (no elopment concerns)   Activity isolative   Speech clear;coherent   Psychomotor / Gait balanced;steady   Activities of Daily Living   Hygiene/Grooming independent   Oral Hygiene independent   Dress street clothes   Room Organization independent     "

## 2018-01-06 NOTE — PLAN OF CARE
Problem: Depressive Symptoms  Goal: Depressive Symptoms  Signs and symptoms of listed problems will be absent or manageable.   Outcome: Improving  Patient said that she feels better and hopeful now. She is willing to start Wellbutrin if it is not going to further damage her kidneys. Patient rated depression 8/10, anxiety 5/10, no psychotic symptoms present. No behavioral issue exhibited or anticipated.

## 2018-01-06 NOTE — PROGRESS NOTES
"Pt has been isolative to her room.  She keeps to herself.  Pt reports that she is \"feeling better\" after speaking with the [facility].  Pt reported that she is feeling depressed and rates her depression level a 8/10 with 10 the worst.  Pt also acknowledges having some anxiety and rated her anxiety level 5/10 with 10 the worst.  Pt denies any SI or SIB thinking.  She reports being open to taking medications.  She had her family visit this afternoon.     01/06/18 1434   Behavioral Health   Hallucinations denies / not responding to hallucinations   Thinking distractable   Orientation person: oriented;place: oriented;date: oriented   Insight (fair)   Judgement (fair)   Eye Contact at examiner   Affect blunted, flat   Mood depressed;anxious   Physical Appearance/Attire attire appropriate to age and situation   Hygiene well groomed   Suicidality (denies SI)   Self Injury (denies SIB thinking)   Elopement (none observed)   Activity isolative;withdrawn   Speech clear;coherent   Psychomotor / Gait balanced;steady   Activities of Daily Living   Hygiene/Grooming independent   Oral Hygiene independent   Dress street clothes   Room Organization independent      "

## 2018-01-07 PROCEDURE — 12400007 ZZH R&B MH INTERMEDIATE UMMC

## 2018-01-07 PROCEDURE — 25000132 ZZH RX MED GY IP 250 OP 250 PS 637: Performed by: CLINICAL NURSE SPECIALIST

## 2018-01-07 PROCEDURE — 25000132 ZZH RX MED GY IP 250 OP 250 PS 637: Performed by: PSYCHIATRY & NEUROLOGY

## 2018-01-07 RX ADMIN — VITAMIN D, TAB 1000IU (100/BT) 2000 UNITS: 25 TAB at 08:44

## 2018-01-07 RX ADMIN — DILTIAZEM HYDROCHLORIDE 240 MG: 120 CAPSULE, COATED, EXTENDED RELEASE ORAL at 09:13

## 2018-01-07 RX ADMIN — ESCITALOPRAM OXALATE 5 MG: 5 TABLET, FILM COATED ORAL at 08:44

## 2018-01-07 RX ADMIN — LEVOTHYROXINE SODIUM 100 MCG: 50 TABLET ORAL at 08:44

## 2018-01-07 RX ADMIN — LOSARTAN POTASSIUM 50 MG: 50 TABLET, FILM COATED ORAL at 08:44

## 2018-01-07 ASSESSMENT — ACTIVITIES OF DAILY LIVING (ADL)
LAUNDRY: WITH SUPERVISION
ORAL_HYGIENE: INDEPENDENT
DRESS: INDEPENDENT
GROOMING: INDEPENDENT
ORAL_HYGIENE: INDEPENDENT
LAUNDRY: WITH SUPERVISION
GROOMING: INDEPENDENT
DRESS: STREET CLOTHES

## 2018-01-07 NOTE — PLAN OF CARE
Problem: Overarching Goals (Adult)  Goal: Adheres to Safety Considerations for Self and Others  Outcome: Improving  Intervention: Develop/Maintain Individualized Safety Plan  Patient does not/has never had active suicidal thoughts and would never hurt herself. Has strong gabriella in God and good relationship with family. She did have thoughts of wanting to go to bed and not wake up prior to admission but states today that she wants to live.     Goal: Optimized Coping Skills in Response to Life Stressors  Outcome: Improving  Intervention: Promote Effective Coping Strategies  Patient states she used to have hobbies of reading, exercising daily, listening to music and has not had any interest in doing these things lately. Would like to get back into exercising but worries about tiring easily due to kidney disease. Encouraged to try light activities such as walking and stretching. Patient received a journal from sisters and plans to use it for self-reflection.       Problem: Depressive Symptoms  Goal: Depressive Symptoms  Signs and symptoms of listed problems will be absent or manageable.   Outcome: Improving  Patient denies side effects but reports laying in bed and her fingertips feeling suddenly cold for a few moments.   Goal: Social and Therapeutic (Depression)  Signs and symptoms of listed problems will be absent or manageable.   Outcome: No Change  Patient is not attending groups. Prefers one on one check in with staff. Does socialize and get along with her roommate.

## 2018-01-07 NOTE — PROGRESS NOTES
"Pt was isolative and kept to herself. Pt's sister visited during the day shift, which the pt reports was \"good. I started feeling a lot better after the visit.\" Pt stated that she started reading today, which she says she has been unable to do for years. Pt denies SI/SIB, rates depression 6/10, and rates anxiety 6/10 this evening. Pt reports that she still has racing thoughts, and that she is hopeful for the future. Pt was disappointed to learn that her daughter is unable to visit her on the unit since she is younger than 13 years old. Pt looks forward to talking to her doctor tomorrow.       01/07/18 6860   Behavioral Health   Hallucinations denies / not responding to hallucinations   Thinking distractable   Orientation person: oriented;place: oriented;date: oriented;time: oriented   Memory baseline memory   Insight insight appropriate to events;insight appropriate to situation   Judgement impaired   Eye Contact at examiner   Affect blunted, flat;other (see comments)  (Brightens upon approach)   Mood mood is calm   Physical Appearance/Attire appears stated age;attire appropriate to age and situation   Hygiene well groomed   Suicidality other (see comments)  (Denies)   1. Wish to be Dead No   2. Non-Specific Active Suicidal Thoughts  No   Self Injury other (see comment)  (Denies)   Elopement (None stated, none observed)   Activity isolative;withdrawn   Speech clear;coherent   Medication Sensitivity no stated side effects   Psychomotor / Gait balanced;steady   Psycho Education   Type of Intervention 1:1 intervention   Response participates, initiates socially appropriate   Hours 0.5   Treatment Detail (Self-Reflection & Planning)   Activities of Daily Living   Hygiene/Grooming independent   Oral Hygiene independent   Dress street clothes   Laundry with supervision   Room Organization independent   Behavioral Health Interventions   Depression maintain safety precautions;monitor need to revise level of " observation;maintain safe secure environment;provide emotional support;establish therapeutic relationship   Social and Therapeutic Interventions (Depression) encourage socialization with peers;encourage effective boundaries with peers;encourage participation in therapeutic groups and milieu activities

## 2018-01-08 LAB — DEPRECATED CALCIDIOL+CALCIFEROL SERPL-MC: 14 UG/L (ref 20–75)

## 2018-01-08 PROCEDURE — 90853 GROUP PSYCHOTHERAPY: CPT

## 2018-01-08 PROCEDURE — 97150 GROUP THERAPEUTIC PROCEDURES: CPT | Mod: GO

## 2018-01-08 PROCEDURE — 25000132 ZZH RX MED GY IP 250 OP 250 PS 637: Performed by: CLINICAL NURSE SPECIALIST

## 2018-01-08 PROCEDURE — 25000132 ZZH RX MED GY IP 250 OP 250 PS 637: Performed by: PSYCHIATRY & NEUROLOGY

## 2018-01-08 PROCEDURE — 12400007 ZZH R&B MH INTERMEDIATE UMMC

## 2018-01-08 PROCEDURE — 99233 SBSQ HOSP IP/OBS HIGH 50: CPT | Performed by: PSYCHIATRY & NEUROLOGY

## 2018-01-08 RX ORDER — ESCITALOPRAM OXALATE 5 MG/1
5 TABLET ORAL DAILY
Status: DISCONTINUED | OUTPATIENT
Start: 2018-01-08 | End: 2018-01-09 | Stop reason: HOSPADM

## 2018-01-08 RX ORDER — ESCITALOPRAM OXALATE 10 MG/1
10 TABLET ORAL DAILY
Status: DISCONTINUED | OUTPATIENT
Start: 2018-01-08 | End: 2018-01-08 | Stop reason: DRUGHIGH

## 2018-01-08 RX ORDER — ESCITALOPRAM OXALATE 5 MG/1
5 TABLET ORAL DAILY
Status: DISCONTINUED | OUTPATIENT
Start: 2018-01-08 | End: 2018-01-08

## 2018-01-08 RX ADMIN — LEVOTHYROXINE SODIUM 100 MCG: 50 TABLET ORAL at 09:37

## 2018-01-08 RX ADMIN — DILTIAZEM HYDROCHLORIDE 240 MG: 120 CAPSULE, COATED, EXTENDED RELEASE ORAL at 16:41

## 2018-01-08 RX ADMIN — LOSARTAN POTASSIUM 50 MG: 50 TABLET, FILM COATED ORAL at 09:37

## 2018-01-08 RX ADMIN — ESCITALOPRAM OXALATE 10 MG: 10 TABLET ORAL at 09:37

## 2018-01-08 RX ADMIN — VITAMIN D, TAB 1000IU (100/BT) 2000 UNITS: 25 TAB at 09:36

## 2018-01-08 ASSESSMENT — ACTIVITIES OF DAILY LIVING (ADL)
ORAL_HYGIENE: INDEPENDENT
DRESS: INDEPENDENT
HYGIENE/GROOMING: INDEPENDENT

## 2018-01-08 NOTE — PROGRESS NOTES
Pt had a long 1:1 with this writer during which she talked about the stress in her life, including the kidney diagnosis and work as a nurse. Pt is feeling more hopeful and denies SI. Pt has been visible and has attended all groups. Pleasant all the time.     01/08/18 1400   Behavioral Health   Hallucinations denies / not responding to hallucinations   Thinking (improving)   Orientation person: oriented;place: oriented;date: oriented;time: oriented   Memory baseline memory   Insight insight appropriate to situation   Judgement impaired   Eye Contact at examiner   Affect (neutral)   Mood depressed;anxious   Physical Appearance/Attire appears stated age;attire appropriate to age and situation;neat   Hygiene well groomed   Suicidality (denies)   1. Wish to be Dead No   2. Non-Specific Active Suicidal Thoughts  No   Self Injury (denies)   Elopement (no risk of elopement)   Activity (WDL) WDL   Speech clear;coherent   Medication Sensitivity no stated side effects;no observed side effects   Psychomotor / Gait balanced;steady   Behavioral Health Interventions   Depression maintain safety precautions;monitor need to revise level of observation;maintain safe secure environment;provide emotional support;establish therapeutic relationship;encourage nutrition and hydration;encourage participation / independence with adls;assist with developing and utilizing healthy coping strategies;build upon strengths;assess patient response to medication;assess medication adherance   Social and Therapeutic Interventions (Depression) encourage socialization with peers;encourage effective boundaries with peers;encourage participation in therapeutic groups and milieu activities

## 2018-01-08 NOTE — PROGRESS NOTES
[facility], [place]   Psychiatric Progress Note  Pratima Cullen  [id number]  01/08/18    Chief Complaint: Continued medical care  Time: 39 minutes on encounter, >50% of which was spent in counseling and/or coordination of care consisting of: communication and education with the patient, communication with family and or friends if documented in note, lab/image/study evaluation, support staff communication, and other sources pertinent to excellent patient care.          Interim History:   The patient's care was discussed with the treatment team during the daily team meeting and/or staff's chart notes were reviewed.  Staff report patient has been feeling better she is able to concentrate and read a book and slept about 7 hours.    Pratima was essentially hospitalized for depressive symptoms and having suicidal thoughts not acting like herself according to family members.  She reports to me that she is feeling better and that she is no longer having such severe difficulty with her thoughts and worries.  She feels as though things have improved and she is able to get sleep and rest here in the hospital.  She did have some cold feelings in her fingers that she thought were side effects related to medication however they resolved quite quickly.  She denies any current feelings of hopelessness or helplessness and has future thoughts of going to therapy and possibly day treatment.  She is slightly anxious because she has never done these things previously.  She denies any thoughts of harming herself or others and does have some attention concentration issues as well as energy issues and anhedonia though these things have improved since being here.  She denies any hallucinations or paranoia or any current concerns about being on the unit.  We discussed the current medication she is taking and discussed increasing it to 10 mg so that she would be at a middle dose when she goes to her outpatient  appointments.  We discussed medication in detail.         Medications:       escitalopram  10 mg Oral Daily     cholecalciferol  2,000 Units Oral Daily     diltiazem  240 mg Oral Daily     losartan  50 mg Oral Daily     levothyroxine  100 mcg Oral Daily          Allergies:     Allergies   Allergen Reactions     Lisinopril Cough     Cats      Ibuprofen Hives     Metamucil [Psyllium] Itching and Cough     Itching nose     Mold      Ragweeds           Labs:   No results found for this or any previous visit (from the past 24 hour(s)).       Psychiatric Examination:     BP (!) 181/94  Pulse 100  Temp 97.4  F (36.3  C) (Oral)  Resp 16  Ht 1.524 m (5')  Wt 58.3 kg (128 lb 9.6 oz)  LMP 01/05/2018  SpO2 100%  BMI 25.12 kg/m2  Weight is 128 lbs 9.6 oz  Body mass index is 25.12 kg/(m^2).                Sitting Orthostatic BP: 141/99      Sitting Orthostatic Pulse: 98 bpm      Standing Orthostatic BP: 159/98      Standing Orthostatic Pulse: 96 bpm       Weight over time:  Vitals:    01/05/18 1025 01/05/18 1734 01/07/18 0834   Weight: 60.4 kg (133 lb 2 oz) 58.9 kg (129 lb 12.8 oz) 58.3 kg (128 lb 9.6 oz)       Orthostatic Vitals       Most Recent      Sitting Orthostatic /99 01/08 0832    Sitting Orthostatic Pulse (bpm) 98 01/08 0832    Standing Orthostatic /98 01/08 0832    Standing Orthostatic Pulse (bpm) 96 01/08 0832            Pratima is a 47-year-old female wearing hospital scrubs and has black hair.  Her speech is of an appropriate rate and tone in her language is intact.  Her behavior is appropriate and she does not have any abnormal movements and her gait is intact.  Her affect is neutral and she smiles at times.  Her mood she describes is better.  Her thought content consists of the above without thoughts of harming herself or others or current delusions.  Her thought process is slightly ruminative without looseness of association.  She does not have any abnormal perceptions.  She is alert and aware  of her current location and circumstances.  Her attention and concentration appears adequate.  Her cognition and fund of knowledge appear normal.  Her long-term/short-term/remote memory appears intact.  Her insight and judgment are both fair.         Precautions:     Behavioral Orders   Procedures     Code 1 - Restrict to Unit     Routine Programming     As clinically indicated     Status 15     Every 15 minutes.     Suicide precautions          DIagnoses:     Major depressive disorder moderate with anxiety         Assessment & Plan:     Pratima has been doing well on the unit and seems to have already improved with a good night sleep and with having some assistance with her symptoms.  The supports of the milieu and the safety of likely also contributed to her improvement.  I do not suspect that it is related to the medication at this point other than placebo.  We will have her increase her medication to 10 mg so that she has had a moderate dose so she can reevaluate if it is beneficial in the outpatient setting when she does discharge.  We discussed the medication side effects and potential benefits in detail.    Continue voluntary hospitalization most likely discharge tomorrow  Increase escitalopram to 10 mg daily  Connect with partial hospitalization and possibly outpatient therapy and psychiatry at discharge  Letter for work at discharge    The risks, benefits, alternatives and side effects have been discussed and are understood by the patient and other caregivers.      Pasquale Leazma  [facility]      The following document has been created with voice recognition software and may contain unintentional word substitutions.

## 2018-01-08 NOTE — PROGRESS NOTES
Still waiting for pharmacy to send the diltiazem. 2 texts and 1 phone call and the medication is coming.  Pt took lexapro 10mg vs the 5mg today after breakfast. Pt became nauseous and vomited.  Pt was given crackers and ginger ale with some relief. Pts family visited and brought soup and pt stated that helped.  Pt would like to decrease the lexapro back to 5mg. Will update MD with this information.

## 2018-01-08 NOTE — PROGRESS NOTES
"Pt reported her goal for the day was to attend and participate in groups, pt was able to complete this goal. Pt reported that she will be discharging tomorrow and appears hopeful. Pt spent majority of evening shift in room reading or napping. Pt reported depression as 4/10 and anxiety as 4/10. Pt reports her concentration has improved and that she can \"actually read and understand most of it.\" Pt denies SI or SIB. Pt reports an improvement in her appetite and that she can \"actually taste food again.\" Pt reported an improvement in sleep as well. Pt's sisters visited earlier in the day and she was able to eat lunch with them. Upon check in pt was pleasant, calm and cooperative.      01/08/18 1700   Behavioral Health   Hallucinations denies / not responding to hallucinations   Thinking intact   Orientation person: oriented;place: oriented;date: oriented;time: oriented   Memory baseline memory   Insight insight appropriate to situation;insight appropriate to events   Judgement intact   Eye Contact at examiner   Affect full range affect   Mood mood is calm   Physical Appearance/Attire attire appropriate to age and situation;neat   Hygiene well groomed   Suicidality (Denies SI)   1. Wish to be Dead No   2. Non-Specific Active Suicidal Thoughts  No   Self Injury (Denies SIB)   Elopement (No elopment concerns)   Activity isolative;withdrawn   Speech clear;coherent   Medication Sensitivity (naseua, shaky hands)   Psychomotor / Gait balanced;steady   Activities of Daily Living   Hygiene/Grooming independent   Oral Hygiene independent   Dress independent   Room Organization independent     "

## 2018-01-08 NOTE — DISCHARGE INSTRUCTIONS
Behavioral Discharge Planning and Instructions      Summary:  You were admitted on 1/5/2018  due to Depression and Suicidal Ideations.  You were treated by Dr. Pasquale Ledezma MD and discharged on 1/9/18 from [facility]    Principal Diagnosis: Major Depressive Disorder, moderate     Health Care Follow-up Appointments:     Psychiatrist is Dr. Funes @ [facility] in [place]   Phone: [phone]  Fax: [phone] HUC TO FAX DISCHARGE INSTRUCTIONS   [address], [address]  [place] [address]  *You have an intake for psychotherapy with Juan Castro Ph.D [facility] scheduled for Wednesday, January 17th at 9:30 am.   *You have a psychiatric medication management appointment with Dr. Funes scheduled for Wednesday, January 17th at 11:00 am.     Attend all scheduled appointments with your outpatient providers. Call at least 24 hours in advance if you need to reschedule an appointment to ensure continued access to your outpatient providers.   Major Treatments, Procedures and Findings:  You were provided with: a psychiatric assessment, assessed for medical stability, medication evaluation and/or management, group therapy and milieu management    Symptoms to Report: feeling more aggressive, increased confusion, losing more sleep, mood getting worse or thoughts of suicide    Early warning signs can include: increased depression or anxiety sleep disturbances increased thoughts or behaviors of suicide or self-harm  increased unusual thinking, such as paranoia or hearing voices    Safety and Wellness:  Take all medicines as directed.  Make no changes unless your doctor suggests them.      Follow treatment recommendations.  Refrain from alcohol and non-prescribed drugs.  Ask your support system to help you reduce your access to items that could harm yourself or others. If there is a concern for safety, call 911.    Resources:   Crisis Intervention:  "530.124.8933 or 531-333-4624 (TTY: 906.618.8458).  Call anytime for help.  National [place] on Mental Illness (www.[place].charlene.org): 437.159.9989 or 234-101-7988.  Alcoholics Anonymous (www.alcoholics-anonymous.org): Check your phone book for your local chapter.  Suicide Awareness Voices of Education (SAVE) (www.save.org): 225-893-BWIA (5978)  National Suicide Prevention Line (www.mentalhealthmn.org): 888-992-MSWD (9064)  Mental Health Consumer/Survivor Network of [place] (www.mhcsn.net): 273.188.3583 or 275-476-4817  Mental Health Association of [place] (www.mentalhealth.org): 778.951.6248 or 309-625-7483  Self- Management and Recovery Training., [employer]-- Toll free: 120.438.2026  www.AthleteNetwork.SoundFocus  Text 4 Life: txt \"LIFE\" to 21738 for immediate support and crisis intervention  Crisis text line: Text \"START\" to 269-471. Free, confidential, 24/7.  Crisis Intervention: 833.846.5954 or 524-023-7386. Call anytime for help.     The treatment team has appreciated the opportunity to work with you.     Please take care and make your recovery a daily recovery.   If you have any questions or concerns our unit number is [phone].  Thank you.       "

## 2018-01-08 NOTE — PROGRESS NOTES
Writer met with Pt discussed discharge plans. Pt expressed she would like to try working with a psychotherapist in addition to a psychiatrist. Pt expressed uncertainty about whether her schedule could accommodate day treatment or PHP services, but would like to be set up with a female therapist. Pt signed ART for [facility].     Writer called [facility] and scheduled the following appointments for Pt, information also located on Pt's AVS:    Psychiatrist is Dr. Funes @ [facility] in [place]   Phone: [phone]  Fax: [phone] HUC TO FAX DISCHARGE INSTRUCTIONS   [address], [address]  [place] [address]  *You have an intake for psychotherapy with Juan Castro Ph.D [facility] scheduled for Wednesday, January 17th at 9:30 am.   *You have a psychiatric medication management appointment with Dr. Funes scheduled for Wednesday, January 17th at 11:00 am.

## 2018-01-08 NOTE — PLAN OF CARE
Problem: Patient Care Overview  Goal: Team Discussion  Team Plan:   BEHAVIORAL TEAM DISCUSSION    Participants: Isabel KEN RN, Dr. Pasquale Ledezma MD, and Hayley FOX   Progress: Initial behavioral team discussion.   Continued Stay Criteria/Rationale: Pt admitted voluntarily due to increased depression and passive suicidal ideation.   Medical/Physical: See medical consult if applicable   Precautions:   Behavioral Orders   Procedures     Code 1 - Restrict to Unit     Routine Programming     As clinically indicated     Status 15     Every 15 minutes.     Suicide precautions     Plan: The plan is to assess the patient for mental health and medication needs.  The patient will be prescribed medications to treat the identified symptoms.  Upon discharge the patient will be referred to services as appropriate based on the assessment.  Rationale for change in precautions or plan: No change, initial plan.

## 2018-01-08 NOTE — PLAN OF CARE
"Problem: Depressive Symptoms  Goal: Social and Therapeutic (Depression)  Signs and symptoms of listed problems will be absent or manageable.     INITIAL OT NOTE  Pt attended 3 out of 3 OT groups offered. Pt actively participated in occupational therapy clinic. Pt was able to ask for assistance as needed, and initiate multi-step, goal-directed task. Pt demonstrated good focus, planning, problem solving, and excellent attention to detail. Pt attended to task for full x2 hours. Pt appeared comfortable interacting with peers, but mostly kept to herself. Pt actively participated in a structured occupational therapy group with a focus on positive affirmations. Pt completed about 50% of positive affirmation task independently, which involved filling in sentences with a positive prompt. Appeared comfortable sharing with peers, and was respectful in listening to peers throughout. Verbalized benefit of self-identification of positive personal characteristics. Many of her affirmations were family-oriented. Pt was pleasant and polite to both peers and staff, and appeared to brighten with social interaction, as she frequently laughed with peers during group.     OT Self-Assessment  Pt was given and completed a written self-assessment form. OT staff reviewed with pt and explained the value of having them involved in their treatment plan, and provided options to meet current needs/self-identified goals.     Pt identified \"thinking too much of negative things such as worrying about my kids for the past 2 months\" as stress that led to hospitalization.  Pt identified the following emotions, thoughts, and behaviors that they are currently dealing with: sadness, anxiety/fear, anger/resentment, feeling helpless, mood swings, feeling depressed, guilt, feeling overwhelmed, racing thoughts, trouble concentrating, trouble making decisions, slowed thinking, obsessive thoughts, social withdrawal, and procrastinating.   Pt identified the " "following priority goals: identify and express feelings in a better way, find resources and support, and manage stress.   Pt identified \"reading, talking to someone\" as her coping skills.   Pt identified \"family\" as her social supports.  Pt identified \"willing to accept suggestions to help me with my coping skills\" as her personal strengths.         "

## 2018-01-09 VITALS
WEIGHT: 128.5 LBS | HEIGHT: 60 IN | OXYGEN SATURATION: 100 % | HEART RATE: 100 BPM | SYSTOLIC BLOOD PRESSURE: 181 MMHG | TEMPERATURE: 98.6 F | RESPIRATION RATE: 16 BRPM | DIASTOLIC BLOOD PRESSURE: 94 MMHG | BODY MASS INDEX: 25.23 KG/M2

## 2018-01-09 PROCEDURE — 25000132 ZZH RX MED GY IP 250 OP 250 PS 637: Performed by: CLINICAL NURSE SPECIALIST

## 2018-01-09 PROCEDURE — 25000132 ZZH RX MED GY IP 250 OP 250 PS 637: Performed by: PSYCHIATRY & NEUROLOGY

## 2018-01-09 PROCEDURE — 99238 HOSP IP/OBS DSCHRG MGMT 30/<: CPT | Performed by: PSYCHIATRY & NEUROLOGY

## 2018-01-09 PROCEDURE — 97150 GROUP THERAPEUTIC PROCEDURES: CPT | Mod: GO

## 2018-01-09 RX ORDER — ESCITALOPRAM OXALATE 5 MG/1
5 TABLET ORAL DAILY
Qty: 30 TABLET | Refills: 0 | Status: SHIPPED | OUTPATIENT
Start: 2018-01-10

## 2018-01-09 RX ADMIN — DILTIAZEM HYDROCHLORIDE 240 MG: 120 CAPSULE, COATED, EXTENDED RELEASE ORAL at 10:54

## 2018-01-09 RX ADMIN — LEVOTHYROXINE SODIUM 100 MCG: 50 TABLET ORAL at 09:18

## 2018-01-09 RX ADMIN — VITAMIN D, TAB 1000IU (100/BT) 2000 UNITS: 25 TAB at 09:18

## 2018-01-09 RX ADMIN — ESCITALOPRAM OXALATE 5 MG: 5 TABLET, FILM COATED ORAL at 09:18

## 2018-01-09 RX ADMIN — LOSARTAN POTASSIUM 50 MG: 50 TABLET, FILM COATED ORAL at 09:18

## 2018-01-09 NOTE — PLAN OF CARE
Problem: Overarching Goals (Adult)  Goal: Adheres to Safety Considerations for Self and Others  Outcome: Adequate for Discharge Date Met: 01/09/18  Patient has demonstrated ability to remain safe. Denies suicidal ideation, plan, intent. Has plan in place to talk to  or sisters or to call a crisis number if thoughts arise.   Goal: Optimized Coping Skills in Response to Life Stressors  Outcome: Adequate for Discharge Date Met: 01/09/18  Patient identifies coping skills of reading and talking with , sisters, children.   Goal: Develops/Participates in Therapeutic [place] to Support Successful Transition  Outcome: Adequate for Discharge Date Met: 01/09/18  Intervention: Mutually Develop Transition Plan  Patient has appointments scheduled for psychotherapy and medication management intake. Patient verbalizes understanding of importance of attending these appointments. All discharge instructions and medications reviewed with patient, verbalizes understanding and reports no further questions. All belongings returned to patient and provided with 30 day supply of escitalopram. Patient is discharged at this time.       Problem: Depressive Symptoms  Goal: Depressive Symptoms  Signs and symptoms of listed problems will be absent or manageable.   Outcome: Adequate for Discharge Date Met: 01/09/18  Patient endorses depression but mood has improved and she no longer has the wish to be dead.

## 2018-01-09 NOTE — DISCHARGE SUMMARY
[facility], [place]   Psychiatric Discharge Summary  Time: 28 minutes on encounter.    Pratima Cullen MRN# [id number]   Age: 47 year old YOB: 1970     Date of Admission:  1/5/2018  Date of Discharge:  01/09/18  Admitting Physician:  Pasquale Ledezma  Discharge Physician:  Pasquale Ledezma         Event Leading to Hospitalization and Hospital Course:   Pratima Cullen was admitted for increased depressive symptoms and suicidal ideation.       See Admission note by Naegele on 1/6 for additional details.     Pratima Cullen was initially hospitalized voluntarily for worsening depressive symptoms and suicidal ideation.  This was more of a passive suicidal ideation as she has not acted on these feelings she was started on escitalopram 5 mg and this was quickly titrated to 10 mg though she had nausea and this was changed back to 5 mg.  She tolerated that dose and possibly because of placebo effects and improving in her sleep her symptoms improved and she was seeking discharge.  She is provided with letters to her work and she has appropriate outpatient follow-up.  On day of discharge she denied any hopelessness or helplessness sleep problems thoughts of harming herself or others any attention or concentration issues or paranoia or hallucinations of any kind.  We additionally went through a safety plan for the future and what to do if she is having side effects related to her medications or other potential issues in the future.    She could potentially go up on the escitalopram in the future as I believe the nausea was likely unrelated.    She should follow-up with her nephrologist about her recent possible change in kidney function.           DIagnoses:   Major depressive disorder moderate with anxiety         Labs:   No results found for this or any previous visit (from the past 24 hour(s)).         Consults:   No consultations were requested during this admission            Discharge Medications:        Review of your medicines      START taking       Dose / Directions    cholecalciferol 2000 UNITS tablet   Used for:  Vitamin D deficiency        Dose:  2000 Units   Start taking on:  1/10/2018   Take 2,000 Units by mouth daily   Quantity:  30 tablet   Refills:  0       escitalopram 5 MG tablet   Commonly known as:  LEXAPRO        Dose:  5 mg   Start taking on:  1/10/2018   Take 1 tablet (5 mg) by mouth daily   Quantity:  30 tablet   Refills:  0         CONTINUE these medicines which have NOT CHANGED       Dose / Directions    diltiazem 240 MG 24 hr capsule        Dose:  240 mg   Take 240 mg by mouth daily   Refills:  0       levothyroxine 100 MCG tablet   Commonly known as:  SYNTHROID/LEVOTHROID        Dose:  100 mcg   Take 100 mcg by mouth daily   Refills:  0       loratadine 10 MG tablet   Commonly known as:  CLARITIN        Dose:  10 mg   Take 10 mg by mouth daily as needed for allergies   Refills:  0       losartan 50 MG tablet   Commonly known as:  COZAAR        Dose:  50 mg   Take 50 mg by mouth daily   Refills:  0         STOP taking          buPROPion 25 mg quarter-tab   Commonly known as:  WELLBUTRIN                Where to get your medicines      These medications were sent to [facility] [address]  [address] [address]     Phone:  [phone]      escitalopram 5 MG tablet         Some of these will need a paper prescription and others can be bought over the counter. Ask your nurse if you have questions.     You don't need a prescription for these medications      cholecalciferol 2000 UNITS tablet                  Mental Status Examination:   Pratima is a 47-year-old female wearing grey and has black hair.  Her speech is of an appropriate rate and tone in her language is intact.  Her behavior is appropriate and she does not have any abnormal movements and her gait is intact.  Her affect is neutral and she smiles  at times.  Her mood she describes is better.  Her thought content consists of the above without thoughts of harming herself or others or current delusions.  Her thought process is logical without looseness of association.  She does not have any abnormal perceptions.  She is alert and aware of her current location and circumstances.  Her attention and concentration appears adequate.  Her cognition and fund of knowledge appear normal.  Her long-term/short-term/remote memory appears intact.  Her insight and judgment are both good.         Discharge Plan:     Reason for your hospital stay   Depression     Activity   Your activity upon discharge: activity as tolerated     Discharge Instructions   1. Please do not harm yourself or others.  2. Please continue to take your medications.  3. Please follow up with your outpatient care team.  4. Please do not take drugs or alcohol as this will worsen your condition.  5. Please do not take more than the prescribed doses of medications as this may make them dangerous.   6. Please follow your safety plan of action.  7. Please call crisis if having trouble.  8. If having thoughts of harming self or others please come in to the emergency department as soon as possible.     Full Code     Diet   Follow this diet upon discharge: Orders Placed This Encounter     Regular Diet Adult             Further instructions from your care team        Behavioral Discharge Planning and Instructions      Summary:  You were admitted on 1/5/2018  due to Depression and Suicidal Ideations.  You were treated by Dr. Pasquale Ledezma MD and discharged on 1/9/18 from [facility]    Principal Diagnosis: Major Depressive Disorder, moderate     Health Care Follow-up Appointments:     Psychiatrist is Dr. Funes @ [facility] in [place]   Phone: [phone]  Fax: [phone] HUC TO FAX DISCHARGE INSTRUCTIONS   [address], [address]  [place] [address]  *You have an intake for psychotherapy with Juan Castro Ph.D [facility] scheduled for Wednesday, January 17th at 9:30 am.   *You have a psychiatric medication management appointment with Dr. Funes scheduled for Wednesday, January 17th at 11:00 am.     Attend all scheduled appointments with your outpatient providers. Call at least 24 hours in advance if you need to reschedule an appointment to ensure continued access to your outpatient providers.   Major Treatments, Procedures and Findings:  You were provided with: a psychiatric assessment, assessed for medical stability, medication evaluation and/or management, group therapy and milieu management    Symptoms to Report: feeling more aggressive, increased confusion, losing more sleep, mood getting worse or thoughts of suicide    Early warning signs can include: increased depression or anxiety sleep disturbances increased thoughts or behaviors of suicide or self-harm  increased unusual thinking, such as paranoia or hearing voices    Safety and Wellness:  Take all medicines as directed.  Make no changes unless your doctor suggests them.      Follow treatment recommendations.  Refrain from alcohol and non-prescribed drugs.  Ask your support system to help you reduce your access to items that could harm yourself or others. If there is a concern for safety, call 911.    Resources:   Crisis Intervention: 936.691.6445 or 584-737-5533 (TTY: 281.106.5057).  Call anytime for help.  National [place] on Mental Illness (www.[place].charlene.org): 158.392.4895 or 950-777-9204.  Alcoholics Anonymous (www.alcoholics-anonymous.org): Check your phone book for your local chapter.  Suicide Awareness Voices of Education (SAVE) (www.save.org): 458-188-VSWZ (9783)  National Suicide Prevention Line (www.mentalhealthmn.org): 012-799-WGIJ (9113)  Mental Health Consumer/Survivor Network of [place] (www.mhcsn.net): 516.843.2804 or 426-297-7397  Mental Health Association of [place]  "(www.mentalhealth.org): 682.740.6655 or 848-745-4531  Self- Management and Recovery Training., SMART-- Toll free: 744.233.8089  www."[employer]  Text 4 Life: txt \"LIFE\" to 25950 for immediate support and crisis intervention  Crisis text line: Text \"START\" to 520-457. Free, confidential, 24/7.  Crisis Intervention: 941.952.7124 or 469-456-1675. Call anytime for help.     The treatment team has appreciated the opportunity to work with you.     Please take care and make your recovery a daily recovery.   If you have any questions or concerns our unit number is [phone].  Thank you.               Please send copy to Dr. Funes    During hospitalizations patients have perpetuating, complicating, situational, acute, and chronic conditions that lead to risk for suicidality or homicidality. During hospitalizations we mitigate any modifiable risk factors that may have been identified in the history and physical examination and throughout the hospitalization. Chronic non-modifiable risk factors are not able to be changed with acute hospitalization. As a patient that is discharging these risk factors have been modified as much as possible and a supportive network and safety plan has been put in place. Further modifications and assistance will have to be obtained in the outpatient setting and the inpatient hospitalization team is no longer responsible for outcomes.    Pasquale Lezama  [facility] Psychiatry      The following document has been created with voice recognition software and may contain unintentional word substitutions.        "

## 2018-02-19 ENCOUNTER — HOSPITAL ENCOUNTER (EMERGENCY)
Facility: CLINIC | Age: 48
End: 2018-02-19
Payer: COMMERCIAL

## 2022-03-03 NOTE — PROGRESS NOTES
INITIAL OT ASSESSMENT       01/08/18 1500   General Information   Date Initially Attended OT 01/08/18   Special Considerations see note   Clinical Impression   Affect Appropriate to situation   Orientation Oriented to person, place and time   Appearance and ADLs General cleanliness observed in most areas   Attention to Internal Stimuli No observed signs   Interaction Skills Interacts appropriately with staff;Interacts appropriately with peers   Ability to Communicate Needs Independent   Verbal Content Articulate;Clear;Appropriate to topic   Ability to Maintain Boundaries Maintains appropriate physical boundaries;Maintains appropriate verbal boundaries   Participation Independently participates   Concentration Concentrates 50 minutes   Ability to Concentrate Without difficulty   Follows and Comprehends Directions Independently follows multi-step directions   Memory Delayed and immediate recall intact   Organization Independently organizes all tasks   Decision Making Independent   Planning and Problem Solving Independently plans ahead   Ability to Apply and Learn Concepts Applies within group structure   Frustrations / Stress Tolerance Independently identifies sources of frustration/stress;Independently identifies and applies coping skills   Level of Insight Insightful into needs, issues, goals   Self Esteem Can identify positives;Takes risks with support and encouragement;Accepts positive feedback   Social Supports Identifies utilizing supports      w/ RW/fair plus